# Patient Record
Sex: MALE | Race: WHITE | NOT HISPANIC OR LATINO | URBAN - METROPOLITAN AREA
[De-identification: names, ages, dates, MRNs, and addresses within clinical notes are randomized per-mention and may not be internally consistent; named-entity substitution may affect disease eponyms.]

---

## 2017-12-01 ENCOUNTER — APPOINTMENT (RX ONLY)
Dept: URBAN - METROPOLITAN AREA CLINIC 43 | Facility: CLINIC | Age: 77
Setting detail: DERMATOLOGY
End: 2017-12-01

## 2017-12-01 DIAGNOSIS — L42 PITYRIASIS ROSEA: ICD-10-CM

## 2017-12-01 DIAGNOSIS — L85.3 XEROSIS CUTIS: ICD-10-CM

## 2017-12-01 PROBLEM — I10 ESSENTIAL (PRIMARY) HYPERTENSION: Status: ACTIVE | Noted: 2017-12-01

## 2017-12-01 PROCEDURE — ? PRESCRIPTION

## 2017-12-01 PROCEDURE — 99202 OFFICE O/P NEW SF 15 MIN: CPT

## 2017-12-01 RX ORDER — VALACYCLOVIR HYDROCHLORIDE 1 G/1
TABLET, FILM COATED ORAL
Qty: 21 | Refills: 0 | Status: ERX | COMMUNITY
Start: 2017-12-01

## 2017-12-01 RX ORDER — BETAMETHASONE DIPROPIONATE 0.5 MG/G
CREAM TOPICAL
Qty: 1 | Refills: 1 | Status: ERX | COMMUNITY
Start: 2017-12-01

## 2017-12-01 RX ADMIN — BETAMETHASONE DIPROPIONATE: 0.5 CREAM TOPICAL at 00:00

## 2017-12-01 RX ADMIN — VALACYCLOVIR HYDROCHLORIDE: 1 TABLET, FILM COATED ORAL at 00:00

## 2017-12-01 ASSESSMENT — LOCATION SIMPLE DESCRIPTION DERM
LOCATION SIMPLE: ABDOMEN
LOCATION SIMPLE: RIGHT PRETIBIAL REGION
LOCATION SIMPLE: RIGHT UPPER BACK

## 2017-12-01 ASSESSMENT — LOCATION DETAILED DESCRIPTION DERM
LOCATION DETAILED: RIGHT DISTAL PRETIBIAL REGION
LOCATION DETAILED: PERIUMBILICAL SKIN
LOCATION DETAILED: RIGHT INFERIOR MEDIAL UPPER BACK

## 2017-12-01 ASSESSMENT — LOCATION ZONE DERM
LOCATION ZONE: TRUNK
LOCATION ZONE: LEG

## 2017-12-13 ENCOUNTER — APPOINTMENT (RX ONLY)
Dept: URBAN - METROPOLITAN AREA CLINIC 43 | Facility: CLINIC | Age: 77
Setting detail: DERMATOLOGY
End: 2017-12-13

## 2017-12-13 DIAGNOSIS — L42 PITYRIASIS ROSEA: ICD-10-CM

## 2017-12-13 PROBLEM — L30.9 DERMATITIS, UNSPECIFIED: Status: ACTIVE | Noted: 2017-12-13

## 2017-12-13 PROCEDURE — ? PRESCRIPTION

## 2017-12-13 PROCEDURE — ? BIOPSY BY PUNCH METHOD

## 2017-12-13 PROCEDURE — 11100: CPT

## 2017-12-13 RX ORDER — BETAMETHASONE DIPROPIONATE 0.5 MG/G
OINTMENT TOPICAL
Qty: 1 | Refills: 0 | Status: ERX | COMMUNITY
Start: 2017-12-13

## 2017-12-13 RX ADMIN — BETAMETHASONE DIPROPIONATE: 0.5 OINTMENT TOPICAL at 00:00

## 2017-12-13 ASSESSMENT — LOCATION ZONE DERM: LOCATION ZONE: TRUNK

## 2017-12-13 ASSESSMENT — LOCATION DETAILED DESCRIPTION DERM: LOCATION DETAILED: PERIUMBILICAL SKIN

## 2017-12-13 ASSESSMENT — LOCATION SIMPLE DESCRIPTION DERM: LOCATION SIMPLE: ABDOMEN

## 2017-12-13 NOTE — PROCEDURE: BIOPSY BY PUNCH METHOD
Bill For Surgical Tray: no
Epidermal Sutures: 4-0 Nylon
Accession #: [id number]
Wound Care: Petrolatum
Consent: Written consent was obtained and risks were reviewed including but not limited to scarring, infection, bleeding, scabbing, incomplete removal, nerve damage and allergy to anesthesia.
Detail Level: Detailed
Additional Anesthesia Volume In Cc (Will Not Render If 0): 6
Dressing: bandage
Post-Care Instructions: I reviewed with the patient in detail post-care instructions.  1. The biopsy site will be numb for about 2 hours, after that you may experience slight discomfort, typically not   requiring pain medication. 2.  Leave the bandage we applied on overnight and keep the wound dry.  After removing the bandage the next morning,   you may shower or bathe. 3.  Apply ointment and a bandage daily to the wound until it is healed. (typically one to two weeks). Avoid   crusting or scabs to the biopsy site as this slows healing. 4.If bleeding occurs, apply firm pressure to the wound for 15 minutes.  Then apply a clean bandage. 5. If you have persistent bleeding, swelling, pain,or drainage from the wound, phone the office. If your wound was  sutured return to the office for suture removal at the appointed time.
Home Suture Removal Text: Patient was provided a home suture removal kit and will remove their sutures at home.  If they have any questions or difficulties they will call the office.
Punch Size In Mm: 3.5
Hemostasis: None
X Depth Of Punch In Cm (Optional): 0
Anesthesia Volume In Cc: 3
Notification Instructions: Patient will be notified of biopsy results. However, patient instructed to call the office if not contacted within 2 weeks.
Suture Removal: 10 days
Billing Type: Third-Party Bill
Lab: [id number]
Anesthesia Type: 1% lidocaine with epinephrine
Biopsy Type: H and E

## 2017-12-22 ENCOUNTER — APPOINTMENT (RX ONLY)
Dept: URBAN - METROPOLITAN AREA CLINIC 43 | Facility: CLINIC | Age: 77
Setting detail: DERMATOLOGY
End: 2017-12-22

## 2017-12-22 DIAGNOSIS — Z48.02 ENCOUNTER FOR REMOVAL OF SUTURES: ICD-10-CM

## 2017-12-22 PROCEDURE — 99024 POSTOP FOLLOW-UP VISIT: CPT

## 2017-12-22 PROCEDURE — ? SUTURE REMOVAL (GLOBAL PERIOD)

## 2017-12-22 ASSESSMENT — LOCATION SIMPLE DESCRIPTION DERM: LOCATION SIMPLE: ABDOMEN

## 2017-12-22 ASSESSMENT — LOCATION DETAILED DESCRIPTION DERM: LOCATION DETAILED: LEFT LATERAL ABDOMEN

## 2017-12-22 ASSESSMENT — LOCATION ZONE DERM: LOCATION ZONE: TRUNK

## 2017-12-22 NOTE — PROCEDURE: SUTURE REMOVAL (GLOBAL PERIOD)
Add 22921 Cpt? (Important Note: In 2017 The Use Of 78876 Is Being Tracked By Cms To Determine Future Global Period Reimbursement For Global Periods): yes
Detail Level: Simple

## 2018-01-04 ENCOUNTER — APPOINTMENT (RX ONLY)
Dept: URBAN - METROPOLITAN AREA CLINIC 43 | Facility: CLINIC | Age: 78
Setting detail: DERMATOLOGY
End: 2018-01-04

## 2018-01-04 DIAGNOSIS — C84.0 MYCOSIS FUNGOIDES: ICD-10-CM | Status: INADEQUATELY CONTROLLED

## 2018-01-04 PROBLEM — L30.9 DERMATITIS, UNSPECIFIED: Status: ACTIVE | Noted: 2018-01-04

## 2018-01-04 PROCEDURE — 99213 OFFICE O/P EST LOW 20 MIN: CPT

## 2018-01-04 PROCEDURE — ? TREATMENT REGIMEN

## 2018-01-16 RX ORDER — BETAMETHASONE DIPROPIONATE 0.5 MG/G
OINTMENT TOPICAL
Qty: 1 | Refills: 0 | Status: ERX

## 2018-04-18 ENCOUNTER — APPOINTMENT (RX ONLY)
Dept: URBAN - METROPOLITAN AREA CLINIC 43 | Facility: CLINIC | Age: 78
Setting detail: DERMATOLOGY
End: 2018-04-18

## 2018-04-18 DIAGNOSIS — L81.4 OTHER MELANIN HYPERPIGMENTATION: ICD-10-CM

## 2018-04-18 DIAGNOSIS — D69.2 OTHER NONTHROMBOCYTOPENIC PURPURA: ICD-10-CM

## 2018-04-18 PROCEDURE — ? TREATMENT REGIMEN

## 2018-04-18 PROCEDURE — 99213 OFFICE O/P EST LOW 20 MIN: CPT

## 2018-04-18 PROCEDURE — ? COUNSELING

## 2018-04-18 ASSESSMENT — LOCATION DETAILED DESCRIPTION DERM
LOCATION DETAILED: RIGHT SUPERIOR LATERAL MALAR CHEEK
LOCATION DETAILED: LEFT ULNAR DORSAL HAND

## 2018-04-18 ASSESSMENT — LOCATION ZONE DERM
LOCATION ZONE: HAND
LOCATION ZONE: FACE

## 2018-04-18 ASSESSMENT — LOCATION SIMPLE DESCRIPTION DERM
LOCATION SIMPLE: RIGHT CHEEK
LOCATION SIMPLE: LEFT HAND

## 2018-04-18 NOTE — HPI: SKIN LESION
Is This A New Presentation, Or A Follow-Up?: Skin Lesion
How Severe Is Your Skin Lesion?: moderate
Has Your Skin Lesion Been Treated?: not been treated
Additional History: \\n* has darker spots on left hand that are getting bigger x 2 wks ago \\n* no hx skin cancer

## 2018-09-28 ENCOUNTER — APPOINTMENT (RX ONLY)
Dept: URBAN - METROPOLITAN AREA CLINIC 43 | Facility: CLINIC | Age: 78
Setting detail: DERMATOLOGY
End: 2018-09-28

## 2018-09-28 DIAGNOSIS — C84.0 MYCOSIS FUNGOIDES: ICD-10-CM | Status: INADEQUATELY CONTROLLED

## 2018-09-28 PROBLEM — L30.9 DERMATITIS, UNSPECIFIED: Status: ACTIVE | Noted: 2018-09-28

## 2018-09-28 PROCEDURE — 99213 OFFICE O/P EST LOW 20 MIN: CPT

## 2018-09-28 PROCEDURE — ? PRESCRIPTION

## 2018-09-28 RX ORDER — BETAMETHASONE DIPROPIONATE 0.5 MG/G
OINTMENT TOPICAL
Qty: 1 | Refills: 4 | Status: ERX

## 2018-09-28 ASSESSMENT — LOCATION ZONE DERM: LOCATION ZONE: TRUNK

## 2018-09-28 ASSESSMENT — LOCATION DETAILED DESCRIPTION DERM: LOCATION DETAILED: INFERIOR THORACIC SPINE

## 2018-09-28 ASSESSMENT — LOCATION SIMPLE DESCRIPTION DERM: LOCATION SIMPLE: UPPER BACK

## 2020-03-02 ENCOUNTER — OFFICE VISIT (OUTPATIENT)
Dept: URBAN - METROPOLITAN AREA CLINIC 71 | Facility: CLINIC | Age: 80
End: 2020-03-02
Payer: MEDICARE

## 2020-03-02 PROCEDURE — 92004 COMPRE OPH EXAM NEW PT 1/>: CPT | Performed by: OPTOMETRIST

## 2020-03-02 ASSESSMENT — INTRAOCULAR PRESSURE
OD: 12
OS: 12

## 2020-03-02 NOTE — IMPRESSION/PLAN
Impression: Combined forms of age-related cataract, left eye: H25.812. Plan: Discussed signs and symptoms of cataract progression. Pt to RTC PRN in the event of changes to visual status. No recommendation for surgery at present time. Will continue to monitor and recheck on 6 months for changes.

## 2020-08-31 ENCOUNTER — OFFICE VISIT (OUTPATIENT)
Dept: URBAN - METROPOLITAN AREA CLINIC 71 | Facility: CLINIC | Age: 80
End: 2020-08-31
Payer: MEDICARE

## 2020-08-31 DIAGNOSIS — H52.4 PRESBYOPIA: Primary | ICD-10-CM

## 2020-08-31 DIAGNOSIS — H43.813 VITREOUS DEGENERATION, BILATERAL: ICD-10-CM

## 2020-08-31 DIAGNOSIS — H25.812 COMBINED FORMS OF AGE-RELATED CATARACT, LEFT EYE: ICD-10-CM

## 2020-08-31 PROCEDURE — 92014 COMPRE OPH EXAM EST PT 1/>: CPT | Performed by: OPTOMETRIST

## 2020-08-31 PROCEDURE — 92134 CPTRZ OPH DX IMG PST SGM RTA: CPT | Performed by: OPTOMETRIST

## 2020-08-31 ASSESSMENT — VISUAL ACUITY
OD: 20/20
OS: 20/40

## 2020-08-31 ASSESSMENT — INTRAOCULAR PRESSURE
OS: 9
OD: 9

## 2020-08-31 ASSESSMENT — KERATOMETRY
OS: 43.63
OD: 43.88

## 2020-08-31 NOTE — IMPRESSION/PLAN
Impression: Combined forms of age-related cataract, left eye: H25.812. Plan: Discussed diagnosis in detail with patient. Discussed treatment options with patient. Discussed risks and benefits and patient understands. Surgical risks and benefits were discussed, explained and understood by patient. Patient elects to have surgery.

## 2021-01-05 ENCOUNTER — OFFICE VISIT (OUTPATIENT)
Dept: URBAN - METROPOLITAN AREA CLINIC 72 | Facility: CLINIC | Age: 81
End: 2021-01-05
Payer: MEDICARE

## 2021-01-05 DIAGNOSIS — Z96.1 PRESENCE OF INTRAOCULAR LENS: ICD-10-CM

## 2021-01-05 PROCEDURE — 92014 COMPRE OPH EXAM EST PT 1/>: CPT | Performed by: OPTOMETRIST

## 2021-01-05 PROCEDURE — 92134 CPTRZ OPH DX IMG PST SGM RTA: CPT | Performed by: OPTOMETRIST

## 2021-01-05 ASSESSMENT — INTRAOCULAR PRESSURE
OD: 10
OS: 10

## 2021-01-05 ASSESSMENT — VISUAL ACUITY: OS: 20/30

## 2021-01-05 NOTE — IMPRESSION/PLAN
Impression: Vitreous degeneration, bilateral: H43.813. no holes or tears- OCT done today. Plan: observe.

## 2021-01-05 NOTE — IMPRESSION/PLAN
Impression: Combined forms of age-related cataract, left eye: H25.812. Plan: Discussed diagnosis in detail with patient. Discussed treatment options with patient. Patient elects to have surgery. Surgical risks and benefits were discussed, explained and understood by patient.  Order for surgery

## 2021-03-02 ENCOUNTER — PRE-OPERATIVE VISIT (OUTPATIENT)
Dept: URBAN - METROPOLITAN AREA CLINIC 71 | Facility: CLINIC | Age: 81
End: 2021-03-02
Payer: MEDICARE

## 2021-03-02 PROCEDURE — 76519 ECHO EXAM OF EYE: CPT | Performed by: OPHTHALMOLOGY

## 2021-03-02 PROCEDURE — 92014 COMPRE OPH EXAM EST PT 1/>: CPT | Performed by: OPHTHALMOLOGY

## 2021-03-02 NOTE — IMPRESSION/PLAN
Impression: Combined forms of age-related cataract, left eye: H25.812. Plan: Discussed diagnosis in detail with patient. Continue using current medication(s). Surgical treatment is necessary to improve vision . Patient elects to have surgery. Surgical risks and benefits were discussed, explained and understood by patient. Lid scrubs and hygiene were explained. Pre op instructions given and understood. Post op instructions given and understood. 


PROCEED WITH CARE PLUS CATARACT SURGERY FOR NEAR, OS ONLY, DEXYCU, CS VIDEO DONE

## 2021-03-15 ENCOUNTER — SURGERY (OUTPATIENT)
Dept: URBAN - METROPOLITAN AREA SURGERY 44 | Facility: SURGERY | Age: 81
End: 2021-03-15
Payer: MEDICARE

## 2021-03-15 PROCEDURE — 66984 XCAPSL CTRC RMVL W/O ECP: CPT | Performed by: OPHTHALMOLOGY

## 2021-03-16 ENCOUNTER — POST-OPERATIVE VISIT (OUTPATIENT)
Dept: URBAN - METROPOLITAN AREA CLINIC 72 | Facility: CLINIC | Age: 81
End: 2021-03-16
Payer: MEDICARE

## 2021-03-16 DIAGNOSIS — Z48.810 ENCOUNTER FOR SURGICAL AFTERCARE FOLLOWING SURGERY ON A SENSE ORGAN: Primary | ICD-10-CM

## 2021-03-16 ASSESSMENT — INTRAOCULAR PRESSURE
OD: 10
OS: 12

## 2021-03-16 NOTE — IMPRESSION/PLAN
Impression: S/P Cataract Extraction by phacoemulsification with IOL placement; ORA OS - 1 Day. Encounter for surgical aftercare following surgery on a sense organ  Z48.810. Excellent post op course   Post operative instructions reviewed - Condition is improving - Plan: --Continue Ciprofloxacin--Advised patient to use artificial tears for comfort.

## 2021-04-06 ENCOUNTER — POST-OPERATIVE VISIT (OUTPATIENT)
Dept: URBAN - METROPOLITAN AREA CLINIC 72 | Facility: CLINIC | Age: 81
End: 2021-04-06
Payer: MEDICARE

## 2021-04-06 PROCEDURE — 99024 POSTOP FOLLOW-UP VISIT: CPT | Performed by: OPTOMETRIST

## 2021-04-06 ASSESSMENT — INTRAOCULAR PRESSURE
OD: 11
OS: 13

## 2021-04-06 ASSESSMENT — VISUAL ACUITY
OD: 20/20
OS: 20/20

## 2021-04-06 NOTE — IMPRESSION/PLAN
Impression: S/P Cataract Extraction by phacoemulsification with IOL placement; ORA OS - 22 Days. Presence of intraocular lens  Z96.1. Excellent post op course   Post operative instructions reviewed - Condition is improving - Plan: --Advised patient to use artificial tears for comfort.

## 2021-11-23 ENCOUNTER — OFFICE VISIT (OUTPATIENT)
Dept: URBAN - METROPOLITAN AREA CLINIC 72 | Facility: CLINIC | Age: 81
End: 2021-11-23
Payer: MEDICARE

## 2021-11-23 DIAGNOSIS — H04.123 DRY EYE SYNDROME OF BILATERAL LACRIMAL GLANDS: ICD-10-CM

## 2021-11-23 PROCEDURE — 99214 OFFICE O/P EST MOD 30 MIN: CPT | Performed by: OPTOMETRIST

## 2021-11-23 ASSESSMENT — INTRAOCULAR PRESSURE
OD: 11
OS: 12

## 2022-11-15 ENCOUNTER — OFFICE VISIT (OUTPATIENT)
Dept: URBAN - METROPOLITAN AREA CLINIC 72 | Facility: CLINIC | Age: 82
End: 2022-11-15
Payer: MEDICARE

## 2022-11-15 DIAGNOSIS — H43.813 VITREOUS DEGENERATION, BILATERAL: Primary | ICD-10-CM

## 2022-11-15 DIAGNOSIS — H04.123 DRY EYE SYNDROME OF BILATERAL LACRIMAL GLANDS: ICD-10-CM

## 2022-11-15 PROCEDURE — 99213 OFFICE O/P EST LOW 20 MIN: CPT

## 2022-11-15 ASSESSMENT — INTRAOCULAR PRESSURE
OS: 9
OD: 10

## 2022-11-15 NOTE — IMPRESSION/PLAN
Impression: Dry eye syndrome of bilateral lacrimal glands: H04.123. Plan: Discussed the nature of the diagnosis with the patient. Recommended patient uses warm compresses 2x a day (morning and night) for 10-15 minutes each time and PF AT's 3-4x a day to help their eyes feel more comfortable and improve their vision slightly. Instructed patient to RTC if any issues or complications arise. Patient expressed understanding.

## 2022-11-15 NOTE — IMPRESSION/PLAN
Impression: Vitreous degeneration, bilateral: H43.813. Plan:  Discussed the nature of the diagnosis with the patient. Discussed signs and symptoms of PVD as well as retinal tear/detachment (New onset of floaters/shower of floaters, flashes of light or curtains in the vision) and instructed patient to RTC if any symptoms are experienced.  Will monitor for changes

## 2024-04-09 ENCOUNTER — OFFICE VISIT (OUTPATIENT)
Dept: URBAN - METROPOLITAN AREA CLINIC 72 | Facility: CLINIC | Age: 84
End: 2024-04-09
Payer: MEDICARE

## 2024-04-09 DIAGNOSIS — H43.813 VITREOUS DEGENERATION, BILATERAL: Primary | ICD-10-CM

## 2024-04-09 DIAGNOSIS — H35.363 DRUSEN (DEGENERATIVE) OF MACULA, BILATERAL: ICD-10-CM

## 2024-04-09 PROCEDURE — 99213 OFFICE O/P EST LOW 20 MIN: CPT | Performed by: OPTOMETRIST

## 2024-04-09 ASSESSMENT — INTRAOCULAR PRESSURE
OS: 11
OD: 10

## 2025-04-14 ENCOUNTER — OFFICE VISIT (OUTPATIENT)
Dept: URBAN - METROPOLITAN AREA CLINIC 71 | Facility: CLINIC | Age: 85
End: 2025-04-14
Payer: MEDICARE

## 2025-04-14 DIAGNOSIS — Z96.1 PRESENCE OF INTRAOCULAR LENS: ICD-10-CM

## 2025-04-14 DIAGNOSIS — H43.813 VITREOUS DEGENERATION, BILATERAL: ICD-10-CM

## 2025-04-14 DIAGNOSIS — H35.363 DRUSEN (DEGENERATIVE) OF MACULA, BILATERAL: Primary | ICD-10-CM

## 2025-04-14 DIAGNOSIS — H04.123 DRY EYE SYNDROME OF BILATERAL LACRIMAL GLANDS: ICD-10-CM

## 2025-04-14 PROCEDURE — 92134 CPTRZ OPH DX IMG PST SGM RTA: CPT

## 2025-04-14 PROCEDURE — 99213 OFFICE O/P EST LOW 20 MIN: CPT

## 2025-04-14 RX ORDER — PERFLUOROHEXYLOCTANE 1 MG/MG
100 % SOLUTION OPHTHALMIC
Qty: 10 | Refills: 5 | Status: ACTIVE
Start: 2025-04-14

## 2025-04-14 ASSESSMENT — INTRAOCULAR PRESSURE
OD: 10
OS: 10